# Patient Record
Sex: MALE | Race: WHITE | NOT HISPANIC OR LATINO | Employment: OTHER | URBAN - METROPOLITAN AREA
[De-identification: names, ages, dates, MRNs, and addresses within clinical notes are randomized per-mention and may not be internally consistent; named-entity substitution may affect disease eponyms.]

---

## 2022-10-30 ENCOUNTER — APPOINTMENT (OUTPATIENT)
Dept: CT IMAGING | Facility: CLINIC | Age: 72
End: 2022-10-30
Attending: EMERGENCY MEDICINE
Payer: COMMERCIAL

## 2022-10-30 ENCOUNTER — HOSPITAL ENCOUNTER (EMERGENCY)
Facility: CLINIC | Age: 72
Discharge: HOME | End: 2022-10-30
Payer: COMMERCIAL

## 2022-10-30 VITALS
HEART RATE: 72 BPM | OXYGEN SATURATION: 98 % | WEIGHT: 180 LBS | SYSTOLIC BLOOD PRESSURE: 149 MMHG | TEMPERATURE: 98.4 F | BODY MASS INDEX: 26.66 KG/M2 | DIASTOLIC BLOOD PRESSURE: 89 MMHG | HEIGHT: 69 IN | RESPIRATION RATE: 16 BRPM

## 2022-10-30 DIAGNOSIS — S39.91XA INJURY OF ABDOMINAL WALL, INITIAL ENCOUNTER: ICD-10-CM

## 2022-10-30 DIAGNOSIS — R91.1 NODULE OF LOWER LOBE OF LEFT LUNG: ICD-10-CM

## 2022-10-30 DIAGNOSIS — R10.9 ABDOMINAL WALL PAIN: ICD-10-CM

## 2022-10-30 LAB
CREAT BLD-MCNC: 1.2 MG/DL (ref 0.7–1.3)
GFR SERPL CREATININE-BSD FRML MDRD: >60 ML/MIN/1.73M2
RADIOLOGIST FLAGS: NORMAL

## 2022-10-30 PROCEDURE — 99285 EMERGENCY DEPT VISIT HI MDM: CPT | Mod: 25

## 2022-10-30 PROCEDURE — 74177 CT ABD & PELVIS W/CONTRAST: CPT

## 2022-10-30 PROCEDURE — 82565 ASSAY OF CREATININE: CPT

## 2022-10-30 PROCEDURE — 250N000011 HC RX IP 250 OP 636: Performed by: EMERGENCY MEDICINE

## 2022-10-30 RX ORDER — IOPAMIDOL 755 MG/ML
100 INJECTION, SOLUTION INTRAVASCULAR ONCE
Status: COMPLETED | OUTPATIENT
Start: 2022-10-30 | End: 2022-10-30

## 2022-10-30 RX ADMIN — IOPAMIDOL 90 ML: 755 INJECTION, SOLUTION INTRAVENOUS at 16:32

## 2022-10-30 ASSESSMENT — ENCOUNTER SYMPTOMS
DIZZINESS: 0
DIARRHEA: 0
ABDOMINAL DISTENTION: 0
NAUSEA: 0
VOMITING: 0
COUGH: 0
NUMBNESS: 0
WEAKNESS: 0
BACK PAIN: 0
CHILLS: 0
FEVER: 0
LIGHT-HEADEDNESS: 0
HEADACHES: 0
ABDOMINAL PAIN: 1
SHORTNESS OF BREATH: 0

## 2022-10-30 ASSESSMENT — ACTIVITIES OF DAILY LIVING (ADL): ADLS_ACUITY_SCORE: 35

## 2022-10-30 NOTE — ED PROVIDER NOTES
EMERGENCY DEPARTMENT ENCOUNTER      NAME: Christ Wilson  AGE: 72 year old male  YOB: 1950  MRN: [id number]  EVALUATION DATE & TIME: 10/30/2022  5:53 PM    PCP: Rick Hernandez    ED PROVIDER: Chata Alfaro PA-C      Chief Complaint   Patient presents with     Chest Injury     Left-sided chest/ribs         FINAL IMPRESSION:  1. Abdominal wall pain    2. Injury of abdominal wall, initial encounter    3. Nodule of lower lobe of left lung          MEDICAL DECISION MAKING:    Pertinent Labs & Imaging studies reviewed. (See chart for details)  Christ Wilson is a 72 year old male who presents for evaluation of left-sided abdominal wall pain after falling down stairs landing on the handlebar of a child's [employer].  Developed continued pain throughout the day causing patient to worry about intra-abdominal injury.  Denies head injury, loss of consciousness, blood thinning medication, nausea, vomiting or other symptoms.    On my initial evaluation, vital signs normal. On physical exam patient is awake, alert, resting comfortably in chair, no acute distress.  Is not ill or toxic appearing.  Heart sounds are normal, lungs are clear.  He does have a small circumferential contusion to his left lower abdominal wall that appears to be a skin alma from when he fell.  No significant bruising, swelling.  No abrasion or bleeding.  He is mildly tender on palpation over this area, but not significantly so. No Distention, rebound, guarding or masses.  Normal bowel sounds..    Differential diagnosis includes contusion, muscular strain, intra-abdominal organ rupture or injury, splenic rupture . Emergency department workup included CT abdomen pelvis. Patient declines wanting any medication for pain.    CT negative for acute intra-abdominal pathology, injury or rupture.  Did show incidental left lower lung nodules for which I let the patient know of these reports.  Did advise him to follow-up with his regular doctor  regarding this for further work-up.    Suspect his pain is muscular in nature versus abdominal wall contusion.  I do not suspect any emergent process as cause for symptoms requiring hospital admission today.  Provided symptom management options at home such as ice or heating pads continue take Tylenol and ibuprofen for the pain provided expectant management as well as strict return precautions for any worsening symptoms.    Patient has had serial examinations and notes significant improvement.     Patient was discharged in stable condition with treatment plan as below. Instructed to follow up with primary care provider in 1-2 days and for repeat CT as needed for lung nodules and return to the emergency department with any new or worsening of symptoms. Patient expressed understanding, feels comfortable, and is in agreement with this plan. All questions addressed prior to discharge.    Medical Decision Making    Supplemental history from: N/A    External Record(s) Reviewed: N/A    Differential Diagnosis: See MDM charting for differential considered.     I performed an independent interpretation of the: N/A    Discussed with radiology regarding test interpretation: N/A    Discussion of management with another provider: See ED Course    The following testing was considered but ultimately not selected: None     I considered prescription management with: Symptomatic Management    The patient's care impacted: None    Consideration of Admission/Observation: N/A - Patient discharged without consideration for admission    Care significantly affected by Social Determinants of Health including: N/A    ED COURSE:  6:45 PM  I reviewed the patient's chart. I met with the patient to gather history and to perform my initial exam.    I wore appropriate PPE during this encounter including: facemask & eye protection   6:57 PM  I rechecked the patient and updated him on results. We discussed plan for discharge including treatment plan,  follow-up and return precautions to emergency department.  Patient voiced understanding and in agreement with this plan.    At the conclusion of the encounter I discussed the results of all of the tests and the disposition. The questions were answered. The patient or family acknowledged understanding and was agreeable with the care plan.     MEDICATIONS GIVEN IN THE EMERGENCY:  Medications   iopamidol (ISOVUE-370) solution 100 mL (90 mLs Intravenous Given 10/30/22 3622)       NEW PRESCRIPTIONS STARTED AT TODAY'S ER VISIT  There are no discharge medications for this patient.      =================================================================    HPI:    Patient information was obtained from: patient    Use of Interpretor: N/A         Christ Wilson is a 72 year old male with no pertinent history  who presents to this ED for evaluation of left abdomen wall pain after a fall.    She reports around 6 AM this morning he was walking downstairs with the lights off when he tripped on the stairs causing him to fall forward.  When he fell he landed on top of a child's toy wagon with the handlebar still in the upright position.  He hit his left upper abdominal wall when he fell onto the handlebar.  There was no bleeding or skin wounds.  He went to work as normal, but had persistent pain in the abdomen.  Reports his wife is a nurse and recommended he be evaluated with a CT scan to make sure there is nothing inside of his abdomen that was damaged.  Patient currently rates pain at a 2 out of 10.  Has not been taking anything for the pain.  Pain radiates to his right abdominal wall, but does not go into his back.  He denies head injury, loss of consciousness or other injuries when he fell.  Denies taking any blood thinning medication.  Denies chest pain, shortness of breath, nausea, vomiting, lightheadedness, dizziness, vision changes, or other symptoms at this time.    REVIEW OF SYSTEMS:  Review of Systems  "  Constitutional: Negative for chills and fever.   Eyes: Negative for visual disturbance.   Respiratory: Negative for cough and shortness of breath.    Cardiovascular: Negative for chest pain.   Gastrointestinal: Positive for abdominal pain (generalized, abdominal wall). Negative for abdominal distention, diarrhea, nausea and vomiting.   Musculoskeletal: Negative for back pain.   Skin:        Positive for skin alma/contusion to left abdominal wall   Neurological: Negative for dizziness, weakness, light-headedness, numbness and headaches.   All other systems reviewed and are negative.       PAST MEDICAL HISTORY:  No past medical history on file.    PAST SURGICAL HISTORY:  No past surgical history on file.    CURRENT MEDICATIONS:    No current facility-administered medications for this encounter.  No current outpatient medications on file.    ALLERGIES:  Allergies   Allergen Reactions     Acetaminophen Nausea and Vomiting     Hydrocodone Nausea and Vomiting       FAMILY HISTORY:  No family history on file.    SOCIAL HISTORY:   Social History     Socioeconomic History     Marital status:        VITALS:  Patient Vitals for the past 24 hrs:   BP Temp Temp src Pulse Resp SpO2 Height Weight   10/30/22 1531 (!) 149/89 98.4  F (36.9  C) Temporal 72 16 98 % 1.753 m (5' 9\") 81.6 kg (180 lb)       PHYSICAL EXAM    Constitutional: Well developed, Well nourished, NAD  HENT: Normocephalic, Atraumatic, Bilateral external ears normal, Oropharynx normal, mucous membranes moist, Nose normal.   Neck: Normal range of motion, No tenderness, Supple, No stridor.  Eyes: PERRL, EOMI, Conjunctiva normal, No discharge.   Respiratory: Normal breath sounds, No respiratory distress, No wheezing, Speaks full sentences easily. No cough.  Cardiovascular: Normal heart rate, Regular rhythm, No murmurs, No rubs, No gallops. Chest wall nontender.  GI: Soft, small circumferential contusion to his left lower abdominal wall that appears to be a skin " alma from when he fell.  No significant bruising, swelling.  No abrasion or bleeding.  He is mildly tender on palpation over this area, but not significantly so.  Distention, rebound, guarding or masses.  Normal bowel sounds..  Musculoskeletal: 2+ DP pulses. No edema. No cyanosis, No clubbing. Good range of motion in all major joints. No tenderness to palpation or major deformities noted. No tenderness of the CTLS spine.   Integument: Warm, Dry, No erythema, No rash. No petechiae.  Neurologic: Alert & oriented x 3, Normal motor function, Normal sensory function, No focal deficits noted. Normal gait.  Psychiatric: Affect normal, Judgment normal, Mood normal. Cooperative.    LAB:  All pertinent labs reviewed and interpreted.  Labs Ordered and Resulted from Time of ED Arrival to Time of ED Departure   ISTAT CREATININE POCT - Normal       Result Value    Creatinine POCT 1.2      GFR, ESTIMATED POCT >60     ISTAT CREATININE POCT       RADIOLOGY:  Reviewed all pertinent imaging. Please see official radiology report.  CT Chest/Abdomen/Pelvis w Contrast   Final Result   IMPRESSION:   1.  No acute findings in the chest, abdomen, or pelvis.      2.  Incidental left lower lobe 0.4 cm noncalcified nodule. If no history of malignancy, consider follow-up per Fleischner recommendations: For low-risk patients, no routine follow-up required. For high-risk patients, optional CT at 12 months.         [Consider Follow Up: Lung nodule]      This report will be copied to the [facility] to ensure a provider acknowledges the finding.              Diagnosis:  1. Abdominal wall pain    2. Injury of abdominal wall, initial encounter    3. Nodule of lower lobe of left lung      Chata Alfaro PA-C  Emergency Medicine  [facility]  10/30/2022

## 2022-10-30 NOTE — DISCHARGE INSTRUCTIONS
Please bring this paperwork with you to your follow-up appointment.    You were seen in the urgent care/emergency department for Injury to abdominal wall.  You had a CT scan done today, which did not show any injury to your internal abdominal organs, no bleeding or other concerning findings.  As we discussed, we did find an incidental nodule in your left lower lung.  Please let your primary care provider know about this and they may want to repeat a CT scan in 1 year.      For your abdominal wall pain, please continue using ice and heating pads to the area as well as taking Tylenol and ibuprofen to help with the pain..     For your symptoms:    Tylenol/ibuprofen as needed  You may take up to 650 mg of Tylenol (acetaminophen) up to 4 times daily and up to 600 mg of ibuprofen up to 4 times daily as needed for fever, pain.  Please do not take more than the daily maximum recommended dose (tylenol = 4 grams, ibuprofen = 2.4 grams) as it can cause harm to your liver, kidneys, stomach.  It is best to take ibuprofen with food. Please read labels of any over-the-counter medicine you may be taking as it may contain Tylenol (acetaminophen) or Advil (ibuprofen).     Follow up with your primary care provider for recheck in 1-2 days for ER follow up.     Return to the emergency department if you develop worsening pain, vomiting, headaches, vomiting, or any other new worsening or concerning symptoms. We'd be happy to see you again.    Thank you for allowing us to be part of your care today.    Take care!  -Chata Alfaro PA-C

## 2022-10-30 NOTE — ED TRIAGE NOTES
The patient presents to the ED with c/o pain to left side of chest/below ribs. States this morning he tripped going down the stairs in the dark and landed on a pole that is sticking up from a wagon. Was told to com here instead of clinic and said he needed a scan. Does not take blood thinners.     Triage Assessment     Row Name 10/30/22 7712       Triage Assessment (Adult)    Airway WDL WDL       Respiratory WDL    Respiratory WDL WDL       Cardiac WDL    Cardiac WDL WDL       Peripheral/Neurovascular WDL    Peripheral Neurovascular WDL WDL       Cognitive/Neuro/Behavioral WDL    Cognitive/Neuro/Behavioral WDL WDL

## 2023-09-11 ENCOUNTER — HOSPITAL ENCOUNTER (EMERGENCY)
Facility: CLINIC | Age: 73
Discharge: HOME | End: 2023-09-11
Attending: EMERGENCY MEDICINE | Admitting: EMERGENCY MEDICINE
Payer: COMMERCIAL

## 2023-09-11 ENCOUNTER — APPOINTMENT (OUTPATIENT)
Dept: RADIOLOGY | Facility: CLINIC | Age: 73
End: 2023-09-11
Attending: EMERGENCY MEDICINE
Payer: COMMERCIAL

## 2023-09-11 VITALS
TEMPERATURE: 99 F | RESPIRATION RATE: 16 BRPM | HEIGHT: 69 IN | WEIGHT: 178 LBS | BODY MASS INDEX: 26.36 KG/M2 | HEART RATE: 71 BPM | OXYGEN SATURATION: 96 % | DIASTOLIC BLOOD PRESSURE: 83 MMHG | SYSTOLIC BLOOD PRESSURE: 150 MMHG

## 2023-09-11 DIAGNOSIS — J06.9 UPPER RESPIRATORY TRACT INFECTION, UNSPECIFIED TYPE: ICD-10-CM

## 2023-09-11 PROBLEM — N13.8 BPH WITH URINARY OBSTRUCTION: Status: ACTIVE | Noted: 2019-02-05

## 2023-09-11 PROBLEM — N40.1 BPH WITH URINARY OBSTRUCTION: Status: ACTIVE | Noted: 2019-02-05

## 2023-09-11 PROBLEM — R12 HEARTBURN: Status: ACTIVE | Noted: 2017-11-22

## 2023-09-11 PROBLEM — K57.30 DIVERTICULAR DISEASE OF LARGE INTESTINE: Status: ACTIVE | Noted: 2018-03-27

## 2023-09-11 PROBLEM — R13.19 ESOPHAGEAL DYSPHAGIA: Status: ACTIVE | Noted: 2017-11-22

## 2023-09-11 PROBLEM — H26.9 BILATERAL INCIPIENT CATARACTS: Status: ACTIVE | Noted: 2019-03-06

## 2023-09-11 PROBLEM — K22.70 BARRETT'S ESOPHAGUS: Status: ACTIVE | Noted: 2023-08-07

## 2023-09-11 PROBLEM — R09.A2 GLOBUS SENSATION: Status: ACTIVE | Noted: 2017-11-22

## 2023-09-11 LAB
FLUAV RNA SPEC QL NAA+PROBE: NEGATIVE
FLUBV RNA RESP QL NAA+PROBE: NEGATIVE
GROUP A STREP BY PCR: NOT DETECTED
RSV RNA SPEC NAA+PROBE: NEGATIVE
SARS-COV-2 RNA RESP QL NAA+PROBE: NEGATIVE

## 2023-09-11 PROCEDURE — 250N000009 HC RX 250: Performed by: EMERGENCY MEDICINE

## 2023-09-11 PROCEDURE — 87651 STREP A DNA AMP PROBE: CPT | Performed by: EMERGENCY MEDICINE

## 2023-09-11 PROCEDURE — 71046 X-RAY EXAM CHEST 2 VIEWS: CPT

## 2023-09-11 PROCEDURE — 99284 EMERGENCY DEPT VISIT MOD MDM: CPT | Mod: 25

## 2023-09-11 PROCEDURE — 999N000157 HC STATISTIC RCP TIME EA 10 MIN

## 2023-09-11 PROCEDURE — 87637 SARSCOV2&INF A&B&RSV AMP PRB: CPT | Performed by: STUDENT IN AN ORGANIZED HEALTH CARE EDUCATION/TRAINING PROGRAM

## 2023-09-11 PROCEDURE — 94640 AIRWAY INHALATION TREATMENT: CPT

## 2023-09-11 RX ORDER — ALBUTEROL SULFATE 90 UG/1
2 AEROSOL, METERED RESPIRATORY (INHALATION) EVERY 6 HOURS PRN
Qty: 18 G | Refills: 0 | Status: SHIPPED | OUTPATIENT
Start: 2023-09-11

## 2023-09-11 RX ORDER — IPRATROPIUM BROMIDE AND ALBUTEROL SULFATE 2.5; .5 MG/3ML; MG/3ML
3 SOLUTION RESPIRATORY (INHALATION) ONCE
Status: COMPLETED | OUTPATIENT
Start: 2023-09-11 | End: 2023-09-11

## 2023-09-11 RX ADMIN — IPRATROPIUM BROMIDE AND ALBUTEROL SULFATE 3 ML: .5; 3 SOLUTION RESPIRATORY (INHALATION) at 08:30

## 2023-09-11 ASSESSMENT — ACTIVITIES OF DAILY LIVING (ADL): ADLS_ACUITY_SCORE: 35

## 2023-09-11 NOTE — ED PROVIDER NOTES
EMERGENCY DEPARTMENT ENCOUNTER      NAME: Christ Wilson  AGE: 73 year old male  YOB: 1950  MRN: [id number]  EVALUATION DATE & TIME: 2023  7:55 AM    PCP: Rick Hernandez    ED PROVIDER: Ian Mayorga D.O.      Chief Complaint   Patient presents with    Pharyngitis    Cough       FINAL IMPRESSION:  1. Upper respiratory tract infection, unspecified type        ED COURSE & MEDICAL DECISION MAKIN:01 AM I met with the patient to gather history and to perform my initial exam. I discussed the plan for care while in the Emergency Department.  9:21 AM I updated patient on results and discussed discharged.         Pertinent Labs & Imaging studies reviewed. (See chart for details)  73 year old male presents to the Emergency Department for evaluation of cough and nasal drainage.  Denies any fever.  Initial differential did include COVID-19, strep pharyngitis, influenza, pneumonia, viral URI.  COVID, strep, influenza testing are all negative.  Chest x-ray does not show any evidence of consolidation or other acute changes to explain his symptoms.  Based on clinical presentation, I do suspect viral URI.  At this time I do not believe requires further intervention, will discharge with albuterol inhaler and instructed on over-the-counter medications.  Patient is comfortable this plan.  Return precautions were discussed.    Medical Decision Making    History:  Supplemental history from: Documented in chart, if applicable  External Record(s) reviewed: Inpatient Record: 10/30    Work Up:  Chart documentation includes differential considered and any EKGs or imaging independently interpreted by provider, where specified.  In additional to work up documented, I considered the following work up: Documented in chart, if applicable.    External consultation:  Discussion of management with another provider: Documented in chart, if applicable    Complicating factors:  Care impacted by chronic illness: N/A  Care  affected by social determinants of health: N/A    Disposition considerations: Discharge. I prescribed additional prescription strength medication(s) as charted. See documentation for any additional details.        At the conclusion of the encounter I discussed the results of all of the tests and the disposition. The questions were answered. The patient or family acknowledged understanding and was agreeable with the care plan.      [facility]    Patient information was obtained from: Patient    Use of : N/A       Christ Wilson is a 73 year old male who presents with a cough.    Patient states that for the past few days he has been having a productive cough overnight. He notes that it is straining which prompted him to the ED. He has been taking NyQuil for ~3 days. Endorses generalized weakness, sore throat, and sinus drainage. Denies fever, headache, nausea, vomiting, diarrhea, and shortness of breath.    Per Chart Review: 10/30/2022 (~11 months ago) at [facility] ED, patient presented with abdominal pain after a fall, CT negative for abdominal pathology but did show left lower lung nodules, patient discharged in stable condition.      REVIEW OF SYSTEMS  Constitutional:  Denies fever, chills, weight loss. Endorses weakness.  Eyes:  No pain, discharge, redness  HENT:  Denies ear pain, congestion. Endorses sinus drainage and sore throat.  Respiratory: No SOB, wheeze. Endorses produtive cough.  Cardiovascular:  No CP, palpitations  GI:  Denies abdominal pain, nausea, vomiting, diarrhea  : Denies dysuria, hematuria  Musculoskeletal:  Denies any new muscle/joint pain, swelling or loss of function.  Skin:  Denies rash, pallor  Neurologic:  Denies headache, focal weakness or sensory changes  Lymph: Denies swollen nodes    All other systems negative unless noted in HPI.    PAST MEDICAL HISTORY:  History reviewed. No pertinent past medical history.    PAST SURGICAL HISTORY:  History reviewed. No pertinent surgical  "history.      CURRENT MEDICATIONS:    No current facility-administered medications for this encounter.     Current Outpatient Medications   Medication    albuterol (PROAIR HFA/PROVENTIL HFA/VENTOLIN HFA) 108 (90 Base) MCG/ACT inhaler         ALLERGIES:  Allergies   Allergen Reactions    Acetaminophen Nausea and Vomiting    Hydrocodone Nausea and Vomiting       FAMILY HISTORY:  History reviewed. No pertinent family history.    SOCIAL HISTORY:  Social History     Socioeconomic History    Marital status:        VITALS:  Patient Vitals for the past 24 hrs:   BP Temp Temp src Pulse Resp SpO2 Height Weight   09/11/23 0900 (!) 150/83 -- -- 71 -- 96 % -- --   09/11/23 0845 -- -- -- 73 -- 98 % -- --   09/11/23 0830 -- -- -- 67 16 98 % -- --   09/11/23 0815 -- -- -- 66 -- 98 % -- --   09/11/23 0800 (!) 153/96 -- -- 70 -- 98 % -- --   09/11/23 0751 (!) 150/95 99.5  F (37.5  C) Oral 65 18 98 % 1.753 m (5' 9\") 80.7 kg (178 lb)       PHYSICAL EXAM    VITAL SIGNS: BP (!) 150/83   Pulse 71   Temp 99.5  F (37.5  C) (Oral)   Resp 16   Ht 1.753 m (5' 9\")   Wt 80.7 kg (178 lb)   SpO2 96%   BMI 26.29 kg/m      General Appearance: Well-appearing, well-nourished, no acute distress  Head:  Normocephalic, without obvious abnormality, atraumatic  Eyes:  PERRL, conjunctiva/corneas clear, EOM's intact,  ENT:  Lips, mucosa, and tongue normal, membranes are moist without pallor, pharyngeal erythema with minimal swelling without exudate  Neck:  Normal ROM, symmetrical, trachea midline    Cardio:  Regular rate and rhythm, no murmur, rub or gallop, 2+ pulses symmetric in all extremities  Pulm:  Clear to auscultation bilaterally, respirations unlabored,  Abdomen:  Soft, non-tender, no rebound or guarding.  Musculoskeletal: Full ROM, no edema, no cyanosis, good ROM of major joints  Integument:  Warm, Dry, No erythema, No rash.    Neurologic:  Alert & oriented.  No focal deficits appreciated.  Ambulatory.  Psychiatric:  Affect normal, " Judgment normal, Mood normal.      LABS  Results for orders placed or performed during the hospital encounter of 09/11/23 (from the past 24 hour(s))   Symptomatic Influenza A/B, RSV, & SARS-CoV2 PCR (COVID-19) Nose    Specimen: Nose; Swab   Result Value Ref Range    Influenza A PCR Negative Negative    Influenza B PCR Negative Negative    RSV PCR Negative Negative    SARS CoV2 PCR Negative Negative    Narrative    Testing was performed using the Xpert Xpress CoV2/Flu/RSV Assay on the [employer] Instrument. This test should be ordered for the detection of SARS-CoV-2, influenza, and RSV viruses in individuals who meet clinical and/or epidemiological criteria. Test performance is unknown in asymptomatic patients. This test is for in vitro diagnostic use under the FDA EUA for laboratories certified under CLIA to perform high or moderate complexity testing. This test has not been FDA cleared or approved. A negative result does not rule out the presence of PCR inhibitors in the specimen or target RNA in concentration below the limit of detection for the assay. If only one viral target is positive but coinfection with multiple targets is suspected, the sample should be re-tested with another FDA cleared, approved, or authorized test, if coinfection would change clinical management. This test was validated by the [facility]. These laboratories are certified under the Clinical Laboratory Improvement Amendments of 1988 (CLIA-88) as qualified to perform high complexity laboratory testing.   Group A Streptococcus PCR Throat Swab    Specimen: Throat; Swab   Result Value Ref Range    Group A strep by PCR Not Detected Not Detected    Narrative    The Xpert Xpress Strep A test, performed on the [employer]  Instrument Systems, is a rapid, qualitative in vitro diagnostic test for the detection of Streptococcus pyogenes (Group A ß-hemolytic Streptococcus, Strep A) in throat swab specimens from patients with  signs and symptoms of pharyngitis. The Xpert Xpress Strep A test can be used as an aid in the diagnosis of Group A Streptococcal pharyngitis. The assay is not intended to monitor treatment for Group A Streptococcus infections. The Xpert Xpress Strep A test utilizes an automated real-time polymerase chain reaction (PCR) to detect Streptococcus pyogenes DNA.   Chest XR,  PA & LAT    Narrative    EXAM: XR CHEST 2 VIEWS  LOCATION: [facility]  DATE: 9/11/2023    INDICATION: Cough  COMPARISON: 12/23/2022      Impression    IMPRESSION: Lungs are clear. No pleural effusion. Heart size and pulmonary vascularity within normal limits.         RADIOLOGY  Chest XR,  PA & LAT   Final Result   IMPRESSION: Lungs are clear. No pleural effusion. Heart size and pulmonary vascularity within normal limits.           I have independently interpreted the above image, no consolidation or pneumothorax on chest x-ray. See radiology report for detail.        MEDICATIONS GIVEN IN THE EMERGENCY:  Medications   ipratropium - albuterol 0.5 mg/2.5 mg/3 mL (DUONEB) neb solution 3 mL (3 mLs Nebulization $Given 9/11/23 9430)       NEW PRESCRIPTIONS STARTED AT TODAY'S ER VISIT  New Prescriptions    ALBUTEROL (PROAIR HFA/PROVENTIL HFA/VENTOLIN HFA) 108 (90 BASE) MCG/ACT INHALER    Inhale 2 puffs into the lungs every 6 hours as needed for shortness of breath, wheezing or cough        I, Citlalli Gaytan, am serving as a scribe to document services personally performed by Ian Mayorga D.O., based on my observations and the provider's statements to me.  I, Ian Mayorga D.O., attest that Citlalli Gaytan is acting in a scribe capacity, has observed my performance of the services and has documented them in accordance with my direction.     Ian Mayorga D.O.  Emergency Medicine  [facility] EMERGENCY ROOM  [address] [facility] [id number] [phone]  Dept: [phone]       Ian Mayorga  DO [place]  09/11/23 103

## 2023-09-11 NOTE — PROGRESS NOTES
Pt on RA, O2 Sats high 90s. BS diminished pre and post neb. No SOB or respiratory distress. Pt complained of sore throat and cough.     Emil Martínez, RT

## 2023-09-11 NOTE — ED TRIAGE NOTES
Patient has sore throat, cough, sinus drainage x1 week. Throat now 5/10, symptoms worse at night. Took nyquil last few nights.      Triage Assessment       Row Name 09/11/23 0752       Triage Assessment (Adult)    Airway WDL WDL       Respiratory WDL    Respiratory WDL X;cough    Cough Frequency frequent       Skin Circulation/Temperature WDL    Skin Circulation/Temperature WDL WDL       Cardiac WDL    Cardiac WDL WDL       Peripheral/Neurovascular WDL    Peripheral Neurovascular WDL WDL       Cognitive/Neuro/Behavioral WDL    Cognitive/Neuro/Behavioral WDL WDL

## 2024-01-06 ENCOUNTER — HEALTH MAINTENANCE LETTER (OUTPATIENT)
Age: 74
End: 2024-01-06

## 2025-01-25 ENCOUNTER — HEALTH MAINTENANCE LETTER (OUTPATIENT)
Age: 75
End: 2025-01-25